# Patient Record
Sex: MALE | Race: WHITE | NOT HISPANIC OR LATINO
[De-identification: names, ages, dates, MRNs, and addresses within clinical notes are randomized per-mention and may not be internally consistent; named-entity substitution may affect disease eponyms.]

---

## 2018-11-09 ENCOUNTER — TRANSCRIPTION ENCOUNTER (OUTPATIENT)
Age: 18
End: 2018-11-09

## 2020-03-12 ENCOUNTER — EMERGENCY (EMERGENCY)
Facility: HOSPITAL | Age: 20
LOS: 1 days | Discharge: HOME | End: 2020-03-12
Admitting: EMERGENCY MEDICINE
Payer: COMMERCIAL

## 2020-03-12 VITALS
HEART RATE: 105 BPM | RESPIRATION RATE: 16 BRPM | TEMPERATURE: 99 F | DIASTOLIC BLOOD PRESSURE: 94 MMHG | OXYGEN SATURATION: 100 % | SYSTOLIC BLOOD PRESSURE: 152 MMHG

## 2020-03-12 LAB

## 2020-03-12 PROCEDURE — 99283 EMERGENCY DEPT VISIT LOW MDM: CPT

## 2020-03-12 PROCEDURE — 71046 X-RAY EXAM CHEST 2 VIEWS: CPT | Mod: 26

## 2020-03-12 NOTE — ED PROVIDER NOTE - PMH
ADHD (attention deficit hyperactivity disorder)    ADHD (attention deficit hyperactivity disorder)    Altered Thought Processes    Asthma    Bipolar 1 disorder, depressed    Cyst of brain  left frontal lobe  Cyst of maxillary sinus  right  Deaf  right ear  Deafness in right ear    Nosebleed    Otitis media    PDD (pervasive developmental disorder)    PDD (pervasive developmental disorder)    Seasonal allergies

## 2020-03-12 NOTE — ED PROVIDER NOTE - CLINICAL SUMMARY MEDICAL DECISION MAKING FREE TEXT BOX
19yM w/pmhx ashtma (has not used inhaler in years), bipolar, ADHD instructed to come to ED by his PMD for fever, cough and sore throat. Pt is well appearing, reports his symptoms are resolving today, afebrile. heart rate 105 in triage, pt and mother confirm hx of tachycardia. Will get CXR to r/o pneumonia and send RVP. Pt is low risk for COVID does not meet criteria for testing at this time, no recent travel, no contacts with travel or known coronavirus.

## 2020-03-12 NOTE — ED PROVIDER NOTE - PATIENT PORTAL LINK FT
You can access the FollowMyHealth Patient Portal offered by [facility] by registering at the following website: [email or web address]. By joining [facility]’s FollowMyHealth portal, you will also be able to view your health information using other applications (apps) compatible with our system.

## 2020-03-12 NOTE — ED PROVIDER NOTE - NSFOLLOWUPINSTRUCTIONS_ED_ALL_ED_FT
Follow up with your primary care provider within 1 week  Drink plenty of fluids  Take Tylenol 650mg every 6 hours as needed for fever  Return to the ER with any worsening or concerning symptoms, high fever, weakness, shortness of breath, worsening cough or any other concerns.

## 2020-03-12 NOTE — ED PROVIDER NOTE - OBJECTIVE STATEMENT
19yM w/pmhx ashtma (has not used inhaler in years), bipolar, ADHD instructed to come to ED by his PMD for fever, cough and sore throat. Pt states 2 nights ago he developed cough and sore throat. Yesterday he had fever of 100.2. Associated he has mild headache. Pt called his PMD and was instructed to come to the ED. He states his symptoms are resolving today and feels like he has a cold. Pt denies difficulty swallowing or speaking, weakness, shortness of breath, body aches, chest pain, palpitations, recent travel, sick contacts, contacts with known COVID or any other concerns.  Of note pt states he has been diagnosed with tachycardia? mother at bedside confirms.

## 2024-10-04 NOTE — ED ADULT TRIAGE NOTE - CHIEF COMPLAINT QUOTE
Pt states he think he has symptoms of the corona virus.  Pt c/o sore throat and low grade fever now resolved.  Denies chest pain, sob, dizziness, headache, body ache Time-based billing (NON-critical care)

## 2025-06-10 ENCOUNTER — NON-APPOINTMENT (OUTPATIENT)
Age: 25
End: 2025-06-10